# Patient Record
(demographics unavailable — no encounter records)

---

## 2024-12-04 NOTE — PHYSICAL EXAM
[FreeTextEntry3] : Vascular Exam: DP Pulse (left): Absent. DP Pulse (right): Absent. PT Pulse (left): Absent. PT Pulse (right): Absent. Capillary Return - L: Delayed. Capillary Return - R: Delayed. Temperature Grad - L: Warm to Cool. Temperature Grad - R: Warm to Cool. Class findings (Q8) indicated due to abs L posterior tibial pulse, abs R posterior tibial pulse, abs L dorsalis pedis pulse, abs R dorsalis pedis pulse, hair growth decreased or absent, nail changes (thickening), pigmentary changes (discoloration), skin texture L (thin, shiny), skin texture R (thin, shiny) and temperature changes.       [de-identified] : Orthopedic Exam:  Patient presents with no edema, erythema or ecchymosis at this time.  She expresses no discomfort upon palpation of the plantar right heel at this time. [FreeTextEntry1] : Neurological Exam: Sharp / Dull - L: WNL. Sharp / Dull - R: WNL. Light Touch/pressure - L: WNL. Light Touch/pressure - R: WNL. Hot/cold - L: WNL. Hot/cold - R: WNL. Vibratory - L: WNL. Vibratory - R: WNL.

## 2024-12-04 NOTE — HISTORY OF PRESENT ILLNESS
[FreeTextEntry1] : Derek is a 91-year-old female who presents to the office this afternoon with her daughter complaining of discomfort in her right heel when she stands and walks. as well as for continued care of toenails that are difficult to cut, and long toenails.  The daughter states she does not walk much.  The patient is under the care of Sha Reyes. Mycotic nail pain: upon palpation.  Elongated nail pain: none. Date last seen: 07/03/24.

## 2024-12-04 NOTE — PROCEDURE
[FreeTextEntry1] : Plan:  Examination.  ([id number]).  We had a lengthy discussion concerning the patient's condition, including the difference between arthritic pain and gout.  We offered treatment choices for each.  Right 1st toenail and left 1st toenail.  Fungal nails were debrided using manual cutters and electrical  to patient tolerance. (Jf=[id number]). Right 2nd toenail, right 3rd toenail, right 4th toenail, right 5th toenail, left 2nd toenail, left 3rd toenail, left 4th toenail and left 5th toenail.  Elongated nails were trimmed, (Cc=[id number]).  Patient to be seen again: 9 Weeks

## 2024-12-04 NOTE — REVIEW OF SYSTEMS
[Negative] : Constitutional [FreeTextEntry5] : PVD [FreeTextEntry9] : gout [de-identified] : thick toenails, long toenails